# Patient Record
Sex: MALE | Race: WHITE | NOT HISPANIC OR LATINO
[De-identification: names, ages, dates, MRNs, and addresses within clinical notes are randomized per-mention and may not be internally consistent; named-entity substitution may affect disease eponyms.]

---

## 2022-12-23 ENCOUNTER — APPOINTMENT (OUTPATIENT)
Dept: VASCULAR SURGERY | Facility: CLINIC | Age: 67
End: 2022-12-23
Payer: MEDICARE

## 2022-12-23 VITALS
HEIGHT: 74 IN | SYSTOLIC BLOOD PRESSURE: 138 MMHG | DIASTOLIC BLOOD PRESSURE: 76 MMHG | BODY MASS INDEX: 36.45 KG/M2 | WEIGHT: 284 LBS | OXYGEN SATURATION: 94 % | HEART RATE: 61 BPM | TEMPERATURE: 97.9 F

## 2022-12-23 PROCEDURE — 99205 OFFICE O/P NEW HI 60 MIN: CPT

## 2022-12-23 NOTE — DATA REVIEWED
[FreeTextEntry1] : The venous duplex of right lower extremity found November 11 is discussed with the patient after review in the result.  Patient has no DVT and has a right popliteal cyst of 4.9 cm.

## 2022-12-23 NOTE — ASSESSMENT
[FreeTextEntry1] : From clinical exam and the history it appears that the patient has a venous stasis dermatitis and chronic venous insufficiency of the right lower extremity with possible secondary lymphedema and varicose veins on both lower extremity more on the right than the left.  Clinically appears to have a saphenofemoral junction incompetence and patient may have stasis changes of the right leg most likely from superficial venous reflux.

## 2022-12-23 NOTE — HISTORY OF PRESENT ILLNESS
[FreeTextEntry1] : This is a 67-year-old retired  with a history of hyperlipidemia erectile dysfunction and hypertension who has come in for evaluation of her recurrent cellulitis dermatitis of his right lower extremity.  Patient did have a recent episode which was treated with the p.o. antibiotics and is currently resolving.  Patient had undergone a venous duplex of his right lower extremity on 11/11/2022 which was negative for DVT and there was a 4.9 cm right popliteal cyst.  Patient does have a history of derangement of both knees.  Patient's primary care physician is Dr. Olga sears at [facility] in [place].\par Patient has no past medical history of deep vein thrombosis and has never been on any anticoagulation.\par The patient does have varicose veins he has noticed discoloration on his both ankles and distal legs and was always told that he has eczema of both lower extremities.\par The patient has been wearing knee-high surgical support stockings on his own and also had purchased the lymphedema pump from [employer] and has been using it since last 1 year.\par At this time the patient has completed his course of antibiotics and continues to wear stockings.  Patient still has a abrasions and redness with some itching.\par The patient also has occasional night cramps in both lower extremities.

## 2022-12-23 NOTE — PHYSICAL EXAM
[JVD] : no jugular venous distention  [Normal Thyroid] : the thyroid was normal [Normal Breath Sounds] : Normal breath sounds [Normal Heart Sounds] : normal heart sounds [Right Carotid Bruit] : no bruit heard over the right carotid [Left Carotid Bruit] : no bruit heard over the left carotid [2+] : left 2+ [Right Femoral Bruit] : no bruit heard over the right femoral artery [Left Femoral Bruit] : no bruit heard over the left femoral artery [1+] : left 1+ [0] : left 0 [Ankle Swelling (On Exam)] : present [Varicose Veins Of Lower Extremities] : bilaterally [Ankle Swelling On The Right] : mild [] : of the right leg [Ankle Swelling On The Left] : moderate [Abdomen Masses] : No abdominal masses [Abdomen Tenderness] : ~T ~M No abdominal tenderness [Abdominal Bruit] : no abdominal bruit  [Tender] : was nontender [Enlarged] : not enlarged [Stool Sample Taken] : No stool obtained  on rectal exam [Purpura] : purpura [Petechiae] : petechiae [Skin Ulcer] : no ulcer [Skin Induration] : induration [Alert] : alert [Oriented to Person] : oriented to person [Oriented to Place] : oriented to place [Oriented to Time] : oriented to time [Calm] : calm [de-identified] : Well-built well-nourished [de-identified] : Within normal limit [de-identified] : Within normal limit

## 2022-12-23 NOTE — CONSULT LETTER
[Dear  ___] : Dear  [unfilled], [Consult Letter:] : I had the pleasure of evaluating your patient, [unfilled]. [Please see my note below.] : Please see my note below. [Consult Closing:] : Thank you very much for allowing me to participate in the care of this patient.  If you have any questions, please do not hesitate to contact me. [Sincerely,] : Sincerely, [FreeTextEntry2] : Dr Olga Redd MD  [phone] [FreeTextEntry3] : Bill Sanchez MD\par vascular and wound  care surgeon\par [phone]\par

## 2023-01-23 ENCOUNTER — EMERGENCY (EMERGENCY)
Facility: HOSPITAL | Age: 68
LOS: 0 days | Discharge: HOME | End: 2023-01-23
Attending: STUDENT IN AN ORGANIZED HEALTH CARE EDUCATION/TRAINING PROGRAM
Payer: MEDICARE

## 2023-01-23 ENCOUNTER — APPOINTMENT (OUTPATIENT)
Dept: ULTRASOUND IMAGING | Facility: HOSPITAL | Age: 68
End: 2023-01-23

## 2023-01-23 ENCOUNTER — OUTPATIENT (OUTPATIENT)
Dept: OUTPATIENT SERVICES | Facility: HOSPITAL | Age: 68
LOS: 1 days | Discharge: HOME | End: 2023-01-23
Payer: MEDICARE

## 2023-01-23 VITALS
HEART RATE: 61 BPM | OXYGEN SATURATION: 97 % | SYSTOLIC BLOOD PRESSURE: 169 MMHG | TEMPERATURE: 98 F | DIASTOLIC BLOOD PRESSURE: 88 MMHG | RESPIRATION RATE: 14 BRPM

## 2023-01-23 VITALS
WEIGHT: 179.9 LBS | TEMPERATURE: 98 F | RESPIRATION RATE: 18 BRPM | HEART RATE: 61 BPM | DIASTOLIC BLOOD PRESSURE: 79 MMHG | SYSTOLIC BLOOD PRESSURE: 145 MMHG | HEIGHT: 74 IN | OXYGEN SATURATION: 96 %

## 2023-01-23 DIAGNOSIS — I10 ESSENTIAL (PRIMARY) HYPERTENSION: ICD-10-CM

## 2023-01-23 DIAGNOSIS — R60.0 LOCALIZED EDEMA: ICD-10-CM

## 2023-01-23 DIAGNOSIS — Z88.0 ALLERGY STATUS TO PENICILLIN: ICD-10-CM

## 2023-01-23 DIAGNOSIS — I83.899 VARICOSE VEINS OF UNSPECIFIED LOWER EXTREMITY WITH OTHER COMPLICATIONS: ICD-10-CM

## 2023-01-23 DIAGNOSIS — R60.9 EDEMA, UNSPECIFIED: ICD-10-CM

## 2023-01-23 DIAGNOSIS — I87.2 VENOUS INSUFFICIENCY (CHRONIC) (PERIPHERAL): ICD-10-CM

## 2023-01-23 DIAGNOSIS — I82.431 ACUTE EMBOLISM AND THROMBOSIS OF RIGHT POPLITEAL VEIN: ICD-10-CM

## 2023-01-23 DIAGNOSIS — E78.5 HYPERLIPIDEMIA, UNSPECIFIED: ICD-10-CM

## 2023-01-23 DIAGNOSIS — Z79.82 LONG TERM (CURRENT) USE OF ASPIRIN: ICD-10-CM

## 2023-01-23 LAB
ALBUMIN SERPL ELPH-MCNC: 3.9 G/DL — SIGNIFICANT CHANGE UP (ref 3.3–5)
ALP SERPL-CCNC: 78 U/L — SIGNIFICANT CHANGE UP (ref 40–120)
ALT FLD-CCNC: 40 U/L — SIGNIFICANT CHANGE UP (ref 12–78)
ANION GAP SERPL CALC-SCNC: 5 MMOL/L — SIGNIFICANT CHANGE UP (ref 5–17)
AST SERPL-CCNC: 21 U/L — SIGNIFICANT CHANGE UP (ref 15–37)
BASOPHILS # BLD AUTO: 0.02 K/UL — SIGNIFICANT CHANGE UP (ref 0–0.2)
BASOPHILS NFR BLD AUTO: 0.3 % — SIGNIFICANT CHANGE UP (ref 0–2)
BILIRUB SERPL-MCNC: 0.7 MG/DL — SIGNIFICANT CHANGE UP (ref 0.2–1.2)
BUN SERPL-MCNC: 14 MG/DL — SIGNIFICANT CHANGE UP (ref 7–23)
CALCIUM SERPL-MCNC: 8.6 MG/DL — SIGNIFICANT CHANGE UP (ref 8.5–10.1)
CHLORIDE SERPL-SCNC: 110 MMOL/L — HIGH (ref 96–108)
CO2 SERPL-SCNC: 28 MMOL/L — SIGNIFICANT CHANGE UP (ref 22–31)
CREAT SERPL-MCNC: 1.07 MG/DL — SIGNIFICANT CHANGE UP (ref 0.5–1.3)
EGFR: 76 ML/MIN/1.73M2 — SIGNIFICANT CHANGE UP
EOSINOPHIL # BLD AUTO: 0.17 K/UL — SIGNIFICANT CHANGE UP (ref 0–0.5)
EOSINOPHIL NFR BLD AUTO: 2.9 % — SIGNIFICANT CHANGE UP (ref 0–6)
GLUCOSE SERPL-MCNC: 103 MG/DL — HIGH (ref 70–99)
HCT VFR BLD CALC: 46.6 % — SIGNIFICANT CHANGE UP (ref 39–50)
HGB BLD-MCNC: 15.6 G/DL — SIGNIFICANT CHANGE UP (ref 13–17)
IMM GRANULOCYTES NFR BLD AUTO: 0.3 % — SIGNIFICANT CHANGE UP (ref 0–0.9)
LYMPHOCYTES # BLD AUTO: 1.73 K/UL — SIGNIFICANT CHANGE UP (ref 1–3.3)
LYMPHOCYTES # BLD AUTO: 29.9 % — SIGNIFICANT CHANGE UP (ref 13–44)
MCHC RBC-ENTMCNC: 31.7 PG — SIGNIFICANT CHANGE UP (ref 27–34)
MCHC RBC-ENTMCNC: 33.5 G/DL — SIGNIFICANT CHANGE UP (ref 32–36)
MCV RBC AUTO: 94.7 FL — SIGNIFICANT CHANGE UP (ref 80–100)
MONOCYTES # BLD AUTO: 0.47 K/UL — SIGNIFICANT CHANGE UP (ref 0–0.9)
MONOCYTES NFR BLD AUTO: 8.1 % — SIGNIFICANT CHANGE UP (ref 2–14)
NEUTROPHILS # BLD AUTO: 3.38 K/UL — SIGNIFICANT CHANGE UP (ref 1.8–7.4)
NEUTROPHILS NFR BLD AUTO: 58.5 % — SIGNIFICANT CHANGE UP (ref 43–77)
NRBC # BLD: 0 /100 WBCS — SIGNIFICANT CHANGE UP (ref 0–0)
PLATELET # BLD AUTO: 168 K/UL — SIGNIFICANT CHANGE UP (ref 150–400)
POTASSIUM SERPL-MCNC: 4.3 MMOL/L — SIGNIFICANT CHANGE UP (ref 3.5–5.3)
POTASSIUM SERPL-SCNC: 4.3 MMOL/L — SIGNIFICANT CHANGE UP (ref 3.5–5.3)
PROT SERPL-MCNC: 7.3 GM/DL — SIGNIFICANT CHANGE UP (ref 6–8.3)
RBC # BLD: 4.92 M/UL — SIGNIFICANT CHANGE UP (ref 4.2–5.8)
RBC # FLD: 13.2 % — SIGNIFICANT CHANGE UP (ref 10.3–14.5)
SODIUM SERPL-SCNC: 143 MMOL/L — SIGNIFICANT CHANGE UP (ref 135–145)
WBC # BLD: 5.79 K/UL — SIGNIFICANT CHANGE UP (ref 3.8–10.5)
WBC # FLD AUTO: 5.79 K/UL — SIGNIFICANT CHANGE UP (ref 3.8–10.5)

## 2023-01-23 PROCEDURE — 99284 EMERGENCY DEPT VISIT MOD MDM: CPT

## 2023-01-23 PROCEDURE — 93970 EXTREMITY STUDY: CPT | Mod: 26

## 2023-01-23 RX ORDER — APIXABAN 2.5 MG/1
1 TABLET, FILM COATED ORAL
Qty: 1 | Refills: 0
Start: 2023-01-23

## 2023-01-23 RX ORDER — APIXABAN 2.5 MG/1
10 TABLET, FILM COATED ORAL ONCE
Refills: 0 | Status: COMPLETED | OUTPATIENT
Start: 2023-01-23 | End: 2023-01-23

## 2023-01-23 RX ADMIN — APIXABAN 10 MILLIGRAM(S): 2.5 TABLET, FILM COATED ORAL at 13:50

## 2023-01-23 NOTE — ED PROVIDER NOTE - PHYSICAL EXAMINATION
General appearance: Nontoxic appearing, conversant, afebrile    Eyes: anicteric sclerae, MARQUES, EOMI   HENT: Atraumatic; oropharynx clear, MMM and no ulcerations, no pharyngeal erythema or exudate   Neck: Trachea midline; Full range of motion, supple   Pulm: CTA bl, normal respiratory effort and no intercostal retractions, normal work of breathing   CV: RRR, No murmurs, rubs, or gallops   Abdomen: Soft, non-tender, non-distended; no guarding or rebound   Extremities: 1+ b/l LE peripheral edema, no gross deformities, FROM x4   Skin: Dry, normal temperature, turgor and texture; no rash   Psych: Appropriate affect, cooperative

## 2023-01-23 NOTE — ED PROVIDER NOTE - IV ALTEPLASE EXCL ABS HIDDEN
pt sitting in chairs in intake, slotted for BH in status column. BH called unable to take patient current evaluation going on will get patient after current patient evaluated. 4:30p   recalled spoke with SUDHIR Jean and told pt was to be evaluated medically first. Went to discuss with CN , returned to area and told pt left and was seen by  Von getting into car. RACHANA Moses aware.
show

## 2023-01-23 NOTE — ED PROVIDER NOTE - NSFOLLOWUPINSTRUCTIONS_ED_ALL_ED_FT
Take eliquis as prescribed   Rest, drink plenty of fluids  Advance activity as tolerated  Continue all previously prescribed medications as directed  Follow up with your PMD for further management - bring copies of your results  Return to the ER for chest pain, difficulty breathing, or other new or concerning symptoms

## 2023-01-23 NOTE — ED ADULT NURSE NOTE - OBJECTIVE STATEMENT
Patient is alert and oriented x4. Said he had ultrasound done today and was asked to send in ED because they found a clot on his right leg. Denies any pain at this time. Denies any dizziness, n/v or shortness of breath.

## 2023-01-23 NOTE — ED PROVIDER NOTE - PATIENT PORTAL LINK FT
You can access the FollowMyHealth Patient Portal offered by [facility] by registering at the following website: [email or web address]. By joining [facility]’s FollowMyHealth portal, you will also be able to view your health information using other applications (apps) compatible with our system.

## 2023-01-23 NOTE — ED PROVIDER NOTE - OBJECTIVE STATEMENT
66yo male with pmh hld, htn, eczema presenting with RLe dvt.  Patient has had some swelling in RLE thought 2/2 eczema flare and doctor sent for b/l duplex LE.  Had sono done here (in [facility]) and found R popliteal dvt. No hx dvt.  Patient denies cp, sob.  No fevers and feels well otherwise with no pain.

## 2023-01-23 NOTE — ED PROVIDER NOTE - CLINICAL SUMMARY MEDICAL DECISION MAKING FREE TEXT BOX
Presenting with confirmed RLE DVT (report in [facility]).  No cp, sob, not tachycardic.  No signs or symptoms of PE.  Will check labs for renal function for ac dosing.  Likely dc with doac and return precautions.

## 2023-05-05 ENCOUNTER — APPOINTMENT (OUTPATIENT)
Dept: VASCULAR SURGERY | Facility: CLINIC | Age: 68
End: 2023-05-05
Payer: MEDICARE

## 2023-05-05 VITALS
SYSTOLIC BLOOD PRESSURE: 150 MMHG | OXYGEN SATURATION: 95 % | TEMPERATURE: 98.1 F | WEIGHT: 285 LBS | HEIGHT: 74 IN | HEART RATE: 61 BPM | DIASTOLIC BLOOD PRESSURE: 87 MMHG | BODY MASS INDEX: 36.57 KG/M2

## 2023-05-05 PROCEDURE — 99214 OFFICE O/P EST MOD 30 MIN: CPT

## 2023-05-05 NOTE — PHYSICAL EXAM
[1+] : left 1+ [Ankle Swelling (On Exam)] : present [Ankle Swelling On The Right] : of the right ankle [] : present [Ankle Swelling On The Left] : moderate [Tender] : nontender [Purpura] : purpura [Petechiae] : petechiae [Skin Ulcer] : no ulcer [Skin Induration] : induration

## 2023-05-05 NOTE — ASSESSMENT
[FreeTextEntry1] : The patient has a DVT of the right lower extremity in the popliteal vein and has been on anticoagulation since end of January with 5 mg of Eliquis twice a day.\par Patient with a swelling of the right lower extremity venous insufficiency stasis and excoriations. [Other: _____] : [unfilled]

## 2023-05-05 NOTE — DATA REVIEWED
[FreeTextEntry1] : The venous Doppler from January 23 2023 is reviewed and also the recent Doppler done at outside facility is reviewed and both shows evidence of DVT of the right popliteal vein.  I discussed this findings with the patient

## 2023-05-05 NOTE — CONSULT LETTER
[Dear  ___] : Dear  [unfilled], [Consult Letter:] : I had the pleasure of evaluating your patient, [unfilled]. [Please see my note below.] : Please see my note below. [Consult Closing:] : Thank you very much for allowing me to participate in the care of this patient.  If you have any questions, please do not hesitate to contact me. [Sincerely,] : Sincerely, [FreeTextEntry2] : alejandro Lu [FreeTextEntry3] : Bill Sanchez MD\par vascular and wound  care surgeon\par [phone]\par

## 2023-05-05 NOTE — HISTORY OF PRESENT ILLNESS
[FreeTextEntry1] : The patient has returned to the office today for the follow-up of his DVT of the right lower extremity.  The patient was seen by me in December 2022.  Patient had undergone a venous Doppler of his legs in January 2023 which shows DVT of the right lower extremity.  Patient had no previous history of DVT and patient was started on Eliquis and is currently taking the Eliquis.  Patient also had a venous Doppler repeated few weeks ago which showed a persistent DVT of the right lower extremity in the popliteal vein with a dilatation of the popliteal vein.  The patient is currently on Eliquis 5 mg twice a day.  The patient states that because of Eliquis his right leg is swollen and has itching and bleeds and he would like to stop the Eliquis if possible.  The patient had a previous history of a swelling in the right leg with chronic and recurrent cellulitis which was treated with antibiotics prior to his diagnosis of DVT.

## 2023-05-12 ENCOUNTER — APPOINTMENT (OUTPATIENT)
Dept: VASCULAR SURGERY | Facility: CLINIC | Age: 68
End: 2023-05-12
Payer: MEDICARE

## 2023-05-12 VITALS
SYSTOLIC BLOOD PRESSURE: 150 MMHG | TEMPERATURE: 98.1 F | DIASTOLIC BLOOD PRESSURE: 84 MMHG | WEIGHT: 285 LBS | HEART RATE: 57 BPM | BODY MASS INDEX: 36.57 KG/M2 | OXYGEN SATURATION: 95 % | HEIGHT: 74 IN

## 2023-05-12 PROCEDURE — 29580 STRAPPING UNNA BOOT: CPT | Mod: RT

## 2023-05-12 NOTE — HISTORY OF PRESENT ILLNESS
[FreeTextEntry1] : The patient has come for the follow-up of his right lower extremity swelling.  Patient was treated with Ace wrap compressions for a week patient was able to change it by himself couple of times.  Patient still complains of significant swelling as well as a excoriations of the right leg.  Patient is on anticoagulation Eliquis twice a day for his lower extremity DVT.  Patient has a pain 3 2 out of 3 out of 10 without any  fever or chills.

## 2023-05-12 NOTE — ASSESSMENT
[FreeTextEntry1] : Patient is with right lower extremity chronic edema recurrent cellulitis in the past with history of unresolved DVT of the right popliteal vein.  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

## 2023-05-26 ENCOUNTER — APPOINTMENT (OUTPATIENT)
Dept: VASCULAR SURGERY | Facility: CLINIC | Age: 68
End: 2023-05-26
Payer: MEDICARE

## 2023-05-26 VITALS
BODY MASS INDEX: 36.57 KG/M2 | SYSTOLIC BLOOD PRESSURE: 142 MMHG | WEIGHT: 285 LBS | HEART RATE: 62 BPM | HEIGHT: 74 IN | DIASTOLIC BLOOD PRESSURE: 85 MMHG | OXYGEN SATURATION: 96 % | TEMPERATURE: 98 F

## 2023-05-26 PROCEDURE — 29580 STRAPPING UNNA BOOT: CPT | Mod: RT

## 2023-05-26 NOTE — ASSESSMENT
[FreeTextEntry1] : Patient with a history of DVT on the right lower extremity along with a postphlebitic leg syndrome and partial skin breakdown who requires a compression and with Unna boot and is on long-term anticoagulation at least for 6 to 8 months.

## 2023-05-26 NOTE — HISTORY OF PRESENT ILLNESS
[FreeTextEntry1] : The patient has come for the follow-up of his right lower extremity pain swelling and excoriations.  The patient has a history of for deep vein thromboses on Eliquis and was treated with Unna boots 2 weeks ago with a significant improvement.  Patient had removed Unna boots last week and the leg again started to get worse.  Patient has no fever no chills no new issues with the left lower extremity.  The patient has noticed some open areas on the right ankle as well as redness along the leg on the anterior leg which has been chronic

## 2023-05-26 NOTE — PROCEDURE
[FreeTextEntry1] : Procedure--Unna boot is placed on the right lower extremity today for the treatment of swelling skin breakdown and excoriations in a postphlebitic leg and Ace wrap is placed over the Unna boot.

## 2023-06-02 ENCOUNTER — APPOINTMENT (OUTPATIENT)
Dept: VASCULAR SURGERY | Facility: CLINIC | Age: 68
End: 2023-06-02
Payer: MEDICARE

## 2023-06-02 VITALS
HEART RATE: 62 BPM | WEIGHT: 285 LBS | SYSTOLIC BLOOD PRESSURE: 147 MMHG | OXYGEN SATURATION: 95 % | DIASTOLIC BLOOD PRESSURE: 84 MMHG | BODY MASS INDEX: 36.57 KG/M2 | TEMPERATURE: 98 F | HEIGHT: 74 IN

## 2023-06-02 DIAGNOSIS — L97.919 CHRONIC VENOUS HYPERTENSION (IDIOPATHIC) WITH ULCER OF RIGHT LOWER EXTREMITY: ICD-10-CM

## 2023-06-02 DIAGNOSIS — L03.115 CELLULITIS OF RIGHT LOWER LIMB: ICD-10-CM

## 2023-06-02 DIAGNOSIS — L97.811 NON-PRESSURE CHRONIC ULCER OF OTHER PART OF RIGHT LOWER LEG LIMITED TO BREAKDOWN OF SKIN: ICD-10-CM

## 2023-06-02 DIAGNOSIS — I87.311 CHRONIC VENOUS HYPERTENSION (IDIOPATHIC) WITH ULCER OF RIGHT LOWER EXTREMITY: ICD-10-CM

## 2023-06-02 PROCEDURE — 29580 STRAPPING UNNA BOOT: CPT | Mod: RT

## 2023-06-02 NOTE — ASSESSMENT
[FreeTextEntry1] : Patient with a postphlebitic syndrome with significant improvement with application of Unna boot.  In order to optimize the benefit of the Unna boot another Unna boot is placed today and if significant improvement persist will DC the Unna boots next week and treat with her compression stockings.  The pain is well controlled

## 2023-06-02 NOTE — HISTORY OF PRESENT ILLNESS
[FreeTextEntry1] : The patient is returned to the office today for the follow-up of his right lower extremity pain swelling postphlebitic leg syndrome.  Its patient was treated with a Unna boot last week.  Patient was able to keep the Unna boot for the entire week and the boot is changed today.  The patient also has a no complaints of any significant pain fever or chills and the discomfort is improved.\par The patient has appointment to see the hematologist in 2 weeks.

## 2023-06-02 NOTE — PROCEDURE
[FreeTextEntry1] : Procedure--- Unna boot is placed in the right lower extremity from the toes up to the just below the knee.  Because combines and Ace wrap is placed over 8.

## 2023-06-09 ENCOUNTER — APPOINTMENT (OUTPATIENT)
Dept: VASCULAR SURGERY | Facility: CLINIC | Age: 68
End: 2023-06-09
Payer: MEDICARE

## 2023-06-09 VITALS
BODY MASS INDEX: 36.57 KG/M2 | DIASTOLIC BLOOD PRESSURE: 87 MMHG | OXYGEN SATURATION: 95 % | HEART RATE: 56 BPM | WEIGHT: 285 LBS | HEIGHT: 74 IN | SYSTOLIC BLOOD PRESSURE: 146 MMHG | TEMPERATURE: 97.9 F

## 2023-06-09 PROCEDURE — 99212 OFFICE O/P EST SF 10 MIN: CPT

## 2023-06-09 NOTE — ASSESSMENT
[FreeTextEntry1] : It appears that the patient has significant improvement of the right lower extremity swelling and redness blister with Unna boots and there is no evidence of any issues and completely resolved right leg at this stage. [Other: _____] : [unfilled]

## 2023-06-09 NOTE — HISTORY OF PRESENT ILLNESS
[FreeTextEntry1] : The patient has come for the follow-up of his right lower extremity postphlebitic leg syndrome symptoms.  Patient has been on anticoagulation and will continue for at least couple of more months.  Patient had a significant swelling discomfort and pain with redness and blistering which was treated with application of compression and then with Unna boots at the last 2 visits.  The Unna boot has made significant improvement in his symptoms.  The Unna boot is removed today.  The patient was able to tolerate it without any side effects and felt much improved.\par Patient has no fever no chills no other new issues.  Patient tolerating anticoagulation without any problems no signs symptoms of bleeding or no complaints of bleeding.

## 2023-06-23 ENCOUNTER — APPOINTMENT (OUTPATIENT)
Dept: VASCULAR SURGERY | Facility: CLINIC | Age: 68
End: 2023-06-23
Payer: MEDICARE

## 2023-06-23 VITALS
SYSTOLIC BLOOD PRESSURE: 144 MMHG | TEMPERATURE: 98.1 F | WEIGHT: 275 LBS | HEART RATE: 62 BPM | DIASTOLIC BLOOD PRESSURE: 85 MMHG | HEIGHT: 74 IN | OXYGEN SATURATION: 95 % | BODY MASS INDEX: 35.29 KG/M2

## 2023-06-23 PROCEDURE — 99213 OFFICE O/P EST LOW 20 MIN: CPT

## 2023-06-23 NOTE — ASSESSMENT
[FreeTextEntry1] : Significant improvement in the right lower extremity postphlebitic syndrome symptoms as well as swelling with the application of zinc oxide and wearing stockings

## 2023-06-23 NOTE — HISTORY OF PRESENT ILLNESS
[FreeTextEntry1] : The patient has come for the follow-up of his DVT as well as a postphlebitic leg syndrome symptoms in the right lower extremity.  The patient had done well with the application of the Unna boot and at the last visit 2 weeks ago had DC'd the Unna boot and was advised to wear the stockings.  The patient stated that he has been feeling well and the right leg has no swelling and he has been applying zinc oxide to the leg prior to wearing the stockings and that has helped.  Patient also wears stockings on the left leg.  Patient has a history of DVT and had seen the hematologist Dr. Keith last week and is scheduled to follow-up next Friday.  He had some blood test done and will get the results hopefully next Friday.  The patient is currently on anticoagulation almost nearing 6 months of anticoagulation.\par Patient has significant improvement in the symptoms in the right leg denies any redness swelling

## 2023-07-12 ENCOUNTER — OUTPATIENT (OUTPATIENT)
Dept: OUTPATIENT SERVICES | Facility: HOSPITAL | Age: 68
LOS: 1 days | Discharge: HOME | End: 2023-07-12
Payer: MEDICARE

## 2023-07-12 ENCOUNTER — APPOINTMENT (OUTPATIENT)
Dept: ULTRASOUND IMAGING | Facility: HOSPITAL | Age: 68
End: 2023-07-12

## 2023-07-12 DIAGNOSIS — I82.531 CHRONIC EMBOLISM AND THROMBOSIS OF RIGHT POPLITEAL VEIN: ICD-10-CM

## 2023-07-12 DIAGNOSIS — I87.2 VENOUS INSUFFICIENCY (CHRONIC) (PERIPHERAL): ICD-10-CM

## 2023-07-12 DIAGNOSIS — R60.9 EDEMA, UNSPECIFIED: ICD-10-CM

## 2023-07-12 DIAGNOSIS — M79.604 PAIN IN RIGHT LEG: ICD-10-CM

## 2023-07-12 PROCEDURE — 93970 EXTREMITY STUDY: CPT | Mod: 26

## 2023-07-28 ENCOUNTER — APPOINTMENT (OUTPATIENT)
Dept: VASCULAR SURGERY | Facility: CLINIC | Age: 68
End: 2023-07-28
Payer: MEDICARE

## 2023-07-28 VITALS
OXYGEN SATURATION: 95 % | TEMPERATURE: 97.6 F | BODY MASS INDEX: 35.29 KG/M2 | WEIGHT: 275 LBS | SYSTOLIC BLOOD PRESSURE: 129 MMHG | HEIGHT: 74 IN | DIASTOLIC BLOOD PRESSURE: 81 MMHG | HEART RATE: 60 BPM

## 2023-07-28 PROCEDURE — 99214 OFFICE O/P EST MOD 30 MIN: CPT

## 2023-07-28 NOTE — DATA REVIEWED
[FreeTextEntry1] : The venous Doppler from July 12 is reviewed and discussed with the patient.  Patient with old DVT and persistent DVT of the right popliteal vein

## 2023-07-28 NOTE — HISTORY OF PRESENT ILLNESS
[FreeTextEntry1] : The patient has come for the follow-up of his right lower extremity swelling chronic venous stasis and postphlebitic leg syndrome.  Patient had undergone a venous Doppler on July 12, 2023 which showed persistent old DVT in the right popliteal vein.  Patient since the last visit has also seen a hematologist Dr. Keith and is now started on legs around to 20 mg daily and plans to continue this for another year at least till June of next year 2024 patient's right leg feels much better has no new issues or complaints.  Patient is now compliant with the stockings and patient has no leakage of the fluid no cellulitis no infection.

## 2023-07-28 NOTE — ASSESSMENT
[FreeTextEntry1] : Patient with a postphlebitic right lower extremity syndrome with persistent DVT of the right popliteal vein much improved with the compression leg elevation and anticoagulation.  Patient also evaluated by hematologist and on anticoagulation with Xarelto 20 mg daily as per their recommendations.

## 2023-12-08 ENCOUNTER — APPOINTMENT (OUTPATIENT)
Dept: VASCULAR SURGERY | Facility: CLINIC | Age: 68
End: 2023-12-08
Payer: MEDICARE

## 2023-12-08 VITALS
WEIGHT: 275 LBS | DIASTOLIC BLOOD PRESSURE: 86 MMHG | SYSTOLIC BLOOD PRESSURE: 132 MMHG | OXYGEN SATURATION: 95 % | TEMPERATURE: 98 F | BODY MASS INDEX: 35.29 KG/M2 | HEIGHT: 74 IN | HEART RATE: 69 BPM

## 2023-12-08 DIAGNOSIS — I82.531 CHRONIC EMBOLISM AND THROMBOSIS OF RIGHT POPLITEAL VEIN: ICD-10-CM

## 2023-12-08 DIAGNOSIS — I83.899 VARICOSE VEINS OF UNSPECIFIED LOWER EXTREMITY WITH OTHER COMPLICATIONS: ICD-10-CM

## 2023-12-08 DIAGNOSIS — I87.2 VENOUS INSUFFICIENCY (CHRONIC) (PERIPHERAL): ICD-10-CM

## 2023-12-08 DIAGNOSIS — M79.604 PAIN IN RIGHT LEG: ICD-10-CM

## 2023-12-08 DIAGNOSIS — I89.0 LYMPHEDEMA, NOT ELSEWHERE CLASSIFIED: ICD-10-CM

## 2023-12-08 DIAGNOSIS — R60.9 EDEMA, UNSPECIFIED: ICD-10-CM

## 2023-12-08 PROCEDURE — 99213 OFFICE O/P EST LOW 20 MIN: CPT

## 2024-02-23 ENCOUNTER — APPOINTMENT (OUTPATIENT)
Dept: VASCULAR SURGERY | Facility: CLINIC | Age: 69
End: 2024-02-23